# Patient Record
Sex: FEMALE | Race: WHITE | NOT HISPANIC OR LATINO
[De-identification: names, ages, dates, MRNs, and addresses within clinical notes are randomized per-mention and may not be internally consistent; named-entity substitution may affect disease eponyms.]

---

## 2017-01-06 ENCOUNTER — OTHER (OUTPATIENT)
Age: 57
End: 2017-01-06

## 2017-01-19 ENCOUNTER — RESULT REVIEW (OUTPATIENT)
Age: 57
End: 2017-01-19

## 2017-07-05 ENCOUNTER — APPOINTMENT (OUTPATIENT)
Dept: ORTHOPEDIC SURGERY | Facility: CLINIC | Age: 57
End: 2017-07-05

## 2017-09-05 ENCOUNTER — APPOINTMENT (OUTPATIENT)
Dept: ORTHOPEDIC SURGERY | Facility: CLINIC | Age: 57
End: 2017-09-05
Payer: COMMERCIAL

## 2017-09-05 DIAGNOSIS — M77.11 LATERAL EPICONDYLITIS, RIGHT ELBOW: ICD-10-CM

## 2017-09-05 PROCEDURE — 99214 OFFICE O/P EST MOD 30 MIN: CPT

## 2017-09-05 RX ORDER — ESTRADIOL 0.1 MG/G
0.1 CREAM VAGINAL
Qty: 42 | Refills: 0 | Status: ACTIVE | COMMUNITY
Start: 2017-05-17

## 2021-03-05 ENCOUNTER — APPOINTMENT (OUTPATIENT)
Dept: ORTHOPEDIC SURGERY | Facility: CLINIC | Age: 61
End: 2021-03-05
Payer: COMMERCIAL

## 2021-03-05 VITALS — BODY MASS INDEX: 21.85 KG/M2 | WEIGHT: 128 LBS | HEIGHT: 64 IN | RESPIRATION RATE: 16 BRPM

## 2021-03-05 PROCEDURE — 99072 ADDL SUPL MATRL&STAF TM PHE: CPT

## 2021-03-05 PROCEDURE — 99203 OFFICE O/P NEW LOW 30 MIN: CPT

## 2021-03-05 PROCEDURE — 73110 X-RAY EXAM OF WRIST: CPT | Mod: RT

## 2021-03-05 RX ORDER — ESOMEPRAZOLE MAGNESIUM 20 MG/1
TABLET ORAL
Refills: 0 | Status: ACTIVE | COMMUNITY

## 2021-03-10 ENCOUNTER — NON-APPOINTMENT (OUTPATIENT)
Age: 61
End: 2021-03-10

## 2021-04-12 ENCOUNTER — APPOINTMENT (OUTPATIENT)
Dept: ORTHOPEDIC SURGERY | Facility: CLINIC | Age: 61
End: 2021-04-12
Payer: COMMERCIAL

## 2021-04-12 VITALS — HEIGHT: 64 IN | WEIGHT: 128 LBS | BODY MASS INDEX: 21.85 KG/M2 | RESPIRATION RATE: 16 BRPM

## 2021-04-12 DIAGNOSIS — S69.91XA UNSPECIFIED INJURY OF RIGHT WRIST, HAND AND FINGER(S), INITIAL ENCOUNTER: ICD-10-CM

## 2021-04-12 PROCEDURE — 99213 OFFICE O/P EST LOW 20 MIN: CPT

## 2021-04-12 PROCEDURE — 99072 ADDL SUPL MATRL&STAF TM PHE: CPT

## 2021-04-12 PROCEDURE — 73110 X-RAY EXAM OF WRIST: CPT | Mod: RT

## 2022-04-04 ENCOUNTER — NON-APPOINTMENT (OUTPATIENT)
Age: 62
End: 2022-04-04

## 2022-04-29 ENCOUNTER — NON-APPOINTMENT (OUTPATIENT)
Age: 62
End: 2022-04-29

## 2022-05-02 ENCOUNTER — APPOINTMENT (OUTPATIENT)
Dept: ORTHOPEDIC SURGERY | Facility: CLINIC | Age: 62
End: 2022-05-02
Payer: COMMERCIAL

## 2022-05-02 VITALS
OXYGEN SATURATION: 98 % | HEART RATE: 78 BPM | HEIGHT: 64 IN | TEMPERATURE: 97 F | BODY MASS INDEX: 21.85 KG/M2 | WEIGHT: 128 LBS | SYSTOLIC BLOOD PRESSURE: 150 MMHG | DIASTOLIC BLOOD PRESSURE: 83 MMHG

## 2022-05-02 PROCEDURE — 99215 OFFICE O/P EST HI 40 MIN: CPT | Mod: 25

## 2022-05-02 PROCEDURE — 20610 DRAIN/INJ JOINT/BURSA W/O US: CPT | Mod: RT

## 2022-05-02 PROCEDURE — 99072 ADDL SUPL MATRL&STAF TM PHE: CPT

## 2022-05-02 RX ORDER — DIAZEPAM 5 MG/1
5 TABLET ORAL
Qty: 40 | Refills: 0 | Status: ACTIVE | COMMUNITY
Start: 2021-12-09

## 2022-05-02 RX ORDER — OFLOXACIN 3 MG/ML
0.3 SOLUTION/ DROPS OPHTHALMIC
Qty: 5 | Refills: 0 | Status: COMPLETED | COMMUNITY
Start: 2022-02-28

## 2022-05-02 RX ORDER — CLINDAMYCIN PHOSPHATE 10 MG/ML
1 LOTION TOPICAL
Qty: 60 | Refills: 0 | Status: COMPLETED | COMMUNITY
Start: 2017-01-05 | End: 2022-05-02

## 2022-05-02 RX ORDER — COVID-19 MOLECULAR TEST ASSAY
KIT MISCELLANEOUS
Qty: 1 | Refills: 0 | Status: COMPLETED | COMMUNITY
Start: 2021-11-10

## 2022-05-02 RX ORDER — VALACYCLOVIR 1 G/1
1 TABLET, FILM COATED ORAL
Qty: 30 | Refills: 0 | Status: ACTIVE | COMMUNITY
Start: 2021-12-03

## 2022-05-02 RX ORDER — AMOXICILLIN AND CLAVULANATE POTASSIUM 875; 125 MG/1; MG/1
875-125 TABLET, COATED ORAL
Qty: 14 | Refills: 0 | Status: COMPLETED | COMMUNITY
Start: 2020-10-12 | End: 2022-05-02

## 2022-05-02 NOTE — DISCUSSION/SUMMARY
[de-identified] : 61 year old athletic healthy fit female with anterior knee pain syndrome due to overload at gym with Open chain knee extension. X-rays show no tib femoral DJD but minor Patella DJD. She had previous kneecap realignemnt surgery in her 20's. \par Plan. \par cortisone injection x 1\par Agressive Quad core and gluteal PRE \par Avoid open chain knee extension. closed program only \par F/U 8-9 weeks\par

## 2022-05-02 NOTE — PROCEDURE
[de-identified] : The right knee was prepped with alchohol and ethyl chloride was used to numb the skin. A 3 cc injection with 1 cc xylocaine, 1cc sensoricaine and 1 cc depomedrol , was given without complication into the knee joint. Patient instructed that there may be an inflammatory flare for 24 hrs , to use ice or advil if needed\par \par

## 2022-05-02 NOTE — PHYSICAL EXAM
[de-identified] : Right knee full AROm PROM No effusion Neg Nati and lachman\par Positive PF crepitance and minor tenderness, no apprehension. \par Quads 4=-5/5\par  [de-identified] : 4 views of both knees show no Tibial femoral joint narrowing or abnormality. Bilateral Patella moderate OA. \par

## 2022-05-02 NOTE — HISTORY OF PRESENT ILLNESS
[de-identified] : Hector is a former patient of Dr Raymundo's who comes with a new onset of anterior knee pain that came after using open chain knee extension machine in the gym in December of the past year. \par She hoped the pain would dissipate over time but it keeps coming and going. It is worse with descending stairs and hills and has positive movie sign \par No locking or instability.

## 2022-06-01 ENCOUNTER — APPOINTMENT (OUTPATIENT)
Dept: ORTHOPEDIC SURGERY | Facility: CLINIC | Age: 62
End: 2022-06-01

## 2022-06-17 ENCOUNTER — APPOINTMENT (OUTPATIENT)
Dept: ORTHOPEDIC SURGERY | Facility: CLINIC | Age: 62
End: 2022-06-17
Payer: COMMERCIAL

## 2022-06-17 VITALS — RESPIRATION RATE: 16 BRPM | BODY MASS INDEX: 21.85 KG/M2 | WEIGHT: 128 LBS | HEIGHT: 64 IN

## 2022-06-17 DIAGNOSIS — M72.0 PALMAR FASCIAL FIBROMATOSIS [DUPUYTREN]: ICD-10-CM

## 2022-06-17 PROCEDURE — 99214 OFFICE O/P EST MOD 30 MIN: CPT | Mod: 25

## 2022-06-17 PROCEDURE — 11900 INJECT SKIN LESIONS </W 7: CPT | Mod: LT

## 2022-06-17 PROCEDURE — 99072 ADDL SUPL MATRL&STAF TM PHE: CPT

## 2022-06-17 PROCEDURE — 73140 X-RAY EXAM OF FINGER(S): CPT | Mod: F4

## 2022-06-20 ENCOUNTER — APPOINTMENT (OUTPATIENT)
Dept: ORTHOPEDIC SURGERY | Facility: CLINIC | Age: 62
End: 2022-06-20

## 2023-06-12 ENCOUNTER — APPOINTMENT (OUTPATIENT)
Dept: ORTHOPEDIC SURGERY | Facility: CLINIC | Age: 63
End: 2023-06-12
Payer: COMMERCIAL

## 2023-06-12 DIAGNOSIS — M22.91: ICD-10-CM

## 2023-06-12 PROCEDURE — 99213 OFFICE O/P EST LOW 20 MIN: CPT

## 2023-06-12 NOTE — PHYSICAL EXAM
[de-identified] : Right knee PF crepitance and tenderness no joint line tenderness neg Nati and lachman. \par \par

## 2023-06-12 NOTE — HISTORY OF PRESENT ILLNESS
[de-identified] : JUANCARLOS PRADOSANFORD 62 year female Followup for right knee pain. Pt states her right knee feels a lot better. Pt had done Physical Therapy did well but pain returned . She then sought out a new PT who also does ART and osteopathic eval and RX which has helped her greatly. \par However after multiple PT bouts she wants to know if any options exist for her knee down the road besides replacement. \par

## 2023-06-12 NOTE — DISCUSSION/SUMMARY
[de-identified] : Persistent PF issues with some improvement from PT\par PLan \par MRI to evaluate Trochlea and patella and get TT-TG \par F/U 3-4 weeks

## 2023-07-13 ENCOUNTER — APPOINTMENT (OUTPATIENT)
Dept: ORTHOPEDIC SURGERY | Facility: CLINIC | Age: 63
End: 2023-07-13

## 2023-11-27 ENCOUNTER — APPOINTMENT (OUTPATIENT)
Dept: ORTHOPEDIC SURGERY | Facility: CLINIC | Age: 63
End: 2023-11-27
Payer: COMMERCIAL

## 2023-11-27 VITALS — WEIGHT: 128 LBS | BODY MASS INDEX: 21.85 KG/M2 | HEIGHT: 64 IN | RESPIRATION RATE: 14 BRPM

## 2023-11-27 DIAGNOSIS — S63.659S SPRAIN OF METACARPOPHALANGEAL JOINT OF UNSPECIFIED FINGER, SEQUELA: ICD-10-CM

## 2023-11-27 DIAGNOSIS — S63.642A SPRAIN OF METACARPOPHALANGEAL JOINT OF LEFT THUMB, INITIAL ENCOUNTER: ICD-10-CM

## 2023-11-27 PROCEDURE — 99214 OFFICE O/P EST MOD 30 MIN: CPT

## 2023-11-27 PROCEDURE — 73140 X-RAY EXAM OF FINGER(S): CPT | Mod: FA

## 2024-03-18 ENCOUNTER — APPOINTMENT (OUTPATIENT)
Dept: ORTHOPEDIC SURGERY | Facility: CLINIC | Age: 64
End: 2024-03-18
Payer: COMMERCIAL

## 2024-03-18 VITALS — WEIGHT: 128 LBS | BODY MASS INDEX: 21.85 KG/M2 | HEIGHT: 64 IN | RESPIRATION RATE: 14 BRPM

## 2024-03-18 PROCEDURE — 73110 X-RAY EXAM OF WRIST: CPT | Mod: 50

## 2024-03-18 PROCEDURE — 76882 US LMTD JT/FCL EVL NVASC XTR: CPT

## 2024-03-18 PROCEDURE — 99214 OFFICE O/P EST MOD 30 MIN: CPT | Mod: 25

## 2024-03-18 NOTE — ASSESSMENT
[FreeTextEntry1] : 1 x 1 cm soft tissue mass between the third and fourth extensor compartments of the left wrist clinically consistent with a ganglion cyst.  Options were discussed ranging from conservative management, aspiration, or excision.  Risk associated with each option discussed and all questions answered.  She elected conservative management of activity modifications Coban wraps.  If the mass does not decrease in size and resolve over the next several weeks she return to the office for reevaluation and may consider aspiration.  If it does resolve she can follow on an as-needed basis.

## 2024-03-18 NOTE — PHYSICAL EXAM
[de-identified] : There is a 1 x 1 cm soft tissue mass between the third and fourth dorsal compartments of the left wrist.  The mass itself is nontender and not adherent to local skin or tendons but it is uncomfortable and painful upon forced extension.  There is full range of motion of the wrist with no tenderness over the scaphoid scapholunate or lunotriquetral ligament no tenderness over the scapholunate ligament interval negative Tinsley test no tenderness over the radial ulnar joint or TFCC.  There is good capillary refill of the digits bilaterally.There is no masses or sensitivity over the median and ulnar nerves at the level of the wrist. There is a negative Tinel's and negative Phalen's sign bilaterally. The sensation is grossly intact bilaterally. [de-identified] : PA lateral and oblique of both wrist shows joint spaces symmetric bilaterally without evidence of soft tissue calcifications and no evidence of arthritis of the radiocarpal and midcarpal joints.  Scapholunate ligament stress views in neutral radial and ulnar deviation shows no evidence of carpal instability as compared to the opposite side.  Ultrasound shows a hypoechoic mass emanating from the dorsal aspect of the scapholunate ligament on the left wrist which is without evidence of instability upon stress on dynamic views

## 2024-03-18 NOTE — HISTORY OF PRESENT ILLNESS
[Right] : right hand dominant [FreeTextEntry1] : Patient here for initial evaluation of left dorsal wrist mass which she noticed approximately 1 week ago.  Patient denies any blunt trauma to the left hand and wrist.

## 2024-03-18 NOTE — REASON FOR VISIT
[Follow-Up Visit] : a follow-up visit for [FreeTextEntry2] : mass Review of Systems:  	•	CONSTITUTIONAL - no fever, no diaphoresis, no chills  	•	SKIN - no rash  	•	HEMATOLOGIC - no bleeding, no bruising  	•	EYES - no eye pain, no blurry vision  	•	ENT - no congestion  	•	RESPIRATORY - no shortness of breath, no cough  	•	CARDIAC - no chest pain, no palpitations  	•	GI - no abd pain, no nausea, no vomiting, no diarrhea, no constipation  	•	GENITO-URINARY - no dysuria; no hematuria, no increased urinary frequency  	•	MUSCULOSKELETAL - no joint paint, no swelling, no redness  	•	NEUROLOGIC - +dysarthria, + weakness, no headache, no paresthesias, no LOC  	•	PSYCH - no anxiety, no depression  	All other ROS are negative except as documented in HPI.

## 2024-04-15 ENCOUNTER — APPOINTMENT (OUTPATIENT)
Dept: ORTHOPEDIC SURGERY | Facility: CLINIC | Age: 64
End: 2024-04-15
Payer: COMMERCIAL

## 2024-04-15 VITALS — BODY MASS INDEX: 21.85 KG/M2 | RESPIRATION RATE: 16 BRPM | WEIGHT: 128 LBS | HEIGHT: 64 IN

## 2024-04-15 DIAGNOSIS — M67.432 GANGLION, LEFT WRIST: ICD-10-CM

## 2024-04-15 PROCEDURE — 99214 OFFICE O/P EST MOD 30 MIN: CPT | Mod: 25

## 2024-04-15 PROCEDURE — 20612 ASPIRATE/INJ GANGLION CYST: CPT

## 2024-04-15 NOTE — ASSESSMENT
[FreeTextEntry1] : 13 x 13 mm soft tissue mass dorsal aspect of right wrist between third and fourth compartments with a fourth extensor compartment synovitis.  This is increased in size since previous visit.  The risks, benefits, alternatives and associated differential diagnosis was discussed with the patient. Options ranged from conservative care, therapy to surgical intervention were reviewed and all questions answered. Risks included incomplete resolution of symptoms, worsening of symptoms recurrence, tissue loss, functional loss and other risks associated with treatment of this condition Patient appeared to have an excellent understanding of the risks as well as differential diagnosis associated with this condition.  She elected to proceed with aspiration of the left dorsal ganglion cyst and injection of the fourth extensor compartment.  After the area was cleaned with alcohol to reduce the risk of infection a 20-gauge needle was utilized to aspirate the mass.  Resolution of the mass was noted and 2 cc gelatinous material removed.  A half a cc of Kenalog 10-1/2 cc of 2% lidocaine was placed in the fourth extensor compartment for the fourth compartment tendinitis  Hemostasis was obtained by direct pressure and a sterile dressing and Coban wrap was applied and home program outlined to reduce the risk of recurrence.  If it recurs she is considering observation, second aspiration, or surgical excision of the left wrist dorsal ganglion cyst

## 2024-04-15 NOTE — HISTORY OF PRESENT ILLNESS
[Right] : right hand dominant [FreeTextEntry1] : Patient presents for follow-up evaluation of left dorsal soft tissue mass.  Patient elected conservative management at last visit.  She would like an aspiration today.

## 2024-04-15 NOTE — PHYSICAL EXAM
[de-identified] : Skin is intact there is no evidence of infection but there is a 13 x 13 mm soft tissue mass between the third and fourth extensor compartments of the left wrist.  Mild tenderness over the fourth extensor compartment.  No tenderness over the third compartment.  Symmetric and full range of motion of the wrist and digits bilaterally without evidence of crepitus or instability.  Good capillary refill negative Tinel's sensation grossly intact.

## 2024-11-14 ENCOUNTER — NON-APPOINTMENT (OUTPATIENT)
Age: 64
End: 2024-11-14

## 2024-11-21 ENCOUNTER — APPOINTMENT (OUTPATIENT)
Dept: ORTHOPEDIC SURGERY | Facility: CLINIC | Age: 64
End: 2024-11-21